# Patient Record
Sex: FEMALE | Race: WHITE
[De-identification: names, ages, dates, MRNs, and addresses within clinical notes are randomized per-mention and may not be internally consistent; named-entity substitution may affect disease eponyms.]

---

## 2020-05-12 ENCOUNTER — HOSPITAL ENCOUNTER (OUTPATIENT)
Dept: HOSPITAL 92 - SCSCT | Age: 35
Discharge: HOME | End: 2020-05-12
Attending: PHYSICIAN ASSISTANT
Payer: COMMERCIAL

## 2020-05-12 DIAGNOSIS — N28.89: ICD-10-CM

## 2020-05-12 DIAGNOSIS — R10.12: ICD-10-CM

## 2020-05-12 DIAGNOSIS — K21.9: Primary | ICD-10-CM

## 2020-05-12 PROCEDURE — 74160 CT ABDOMEN W/CONTRAST: CPT

## 2020-05-12 NOTE — CT
EXAM:

CT Abdomen W Con



PROVIDED CLINICAL HISTORY:

Left upper quadrant abdominal pain for 2 weeks. History of hiatal hernia.



COMPARISON:

None



FINDINGS:

Lung bases are clear.



Low-density area is seen in the lateral aspect medial segment left hepatic lobe likely related to foc
al area of fatty infiltration versus perfusion defect. Liver otherwise has a normal CT appearance.



The spleen, pancreas, and bilateral adrenal glands demonstrate a normal CT appearance. The portal vei
n is patent.



Subcentimeter too small to characterize hypodense lesions are seen in the midportion and inferior javon
e right kidney. Kidneys otherwise demonstrate a normal CT appearance bilaterally.



The abdominal aorta is normal in caliber without evidence of an aortic dissection.



Loops of opacified small bowel have a normal appearance.



No free fluid, fluid collection, or lymphadenopathy is seen in the abdomen.



The pelvis was not imaged on this examination.



No suspicious lytic or sclerotic osseous lesions are identified.



IMPRESSION:



1. No acute findings are seen in the abdomen.

2. Subcentimeter too small to characterize hypodense lesions right kidney.



Reported By: Chidi Avila 

Electronically Signed:  5/12/2020 9:37 AM